# Patient Record
Sex: MALE | Race: OTHER | HISPANIC OR LATINO | Employment: UNEMPLOYED | URBAN - METROPOLITAN AREA
[De-identification: names, ages, dates, MRNs, and addresses within clinical notes are randomized per-mention and may not be internally consistent; named-entity substitution may affect disease eponyms.]

---

## 2018-06-13 ENCOUNTER — HOSPITAL ENCOUNTER (EMERGENCY)
Facility: HOSPITAL | Age: 18
Discharge: HOME | End: 2018-06-13
Attending: EMERGENCY MEDICINE

## 2018-06-13 VITALS
RESPIRATION RATE: 18 BRPM | SYSTOLIC BLOOD PRESSURE: 112 MMHG | OXYGEN SATURATION: 99 % | HEART RATE: 65 BPM | DIASTOLIC BLOOD PRESSURE: 66 MMHG | WEIGHT: 144 LBS | TEMPERATURE: 98.1 F

## 2018-06-13 DIAGNOSIS — R25.2 MUSCLE CRAMPS: Primary | ICD-10-CM

## 2018-06-13 LAB
ANION GAP BLD CALC-SCNC: 17 MMOL/L (ref 4–13)
BUN BLD-MCNC: 13 MG/DL (ref 5–25)
CA-I BLD-SCNC: 1.23 MMOL/L (ref 1.12–1.32)
CHLORIDE BLD-SCNC: 103 MMOL/L (ref 100–108)
CREAT BLD-MCNC: 0.9 MG/DL (ref 0.6–1.3)
GFR SERPL CREATININE-BSD FRML MDRD: 123 ML/MIN/1.73SQ M
GLUCOSE SERPL-MCNC: 70 MG/DL (ref 65–140)
HCT VFR BLD CALC: 40 % (ref 36.5–49.3)
HGB BLDA-MCNC: 13.6 G/DL (ref 12–17)
PCO2 BLD: 27 MMOL/L (ref 21–32)
POTASSIUM BLD-SCNC: 3.9 MMOL/L (ref 3.5–5.3)
SODIUM BLD-SCNC: 143 MMOL/L (ref 136–145)
SPECIMEN SOURCE: ABNORMAL

## 2018-06-13 PROCEDURE — 80047 BASIC METABLC PNL IONIZED CA: CPT

## 2018-06-13 PROCEDURE — 99283 EMERGENCY DEPT VISIT LOW MDM: CPT

## 2018-06-13 PROCEDURE — 85014 HEMATOCRIT: CPT

## 2018-06-13 RX ORDER — NAPROXEN 375 MG/1
375 TABLET ORAL 2 TIMES DAILY WITH MEALS
Qty: 20 TABLET | Refills: 0 | Status: SHIPPED | OUTPATIENT
Start: 2018-06-13 | End: 2019-05-10 | Stop reason: ALTCHOICE

## 2018-06-13 NOTE — DISCHARGE INSTRUCTIONS
Calambres en las piernas   LO QUE NECESITA SABER:   Un calambre en la pierna es un apretón doloroso y repentino en la pantorrilla (pierna inferior) o en los músculos del muslo (pierna superior)  El músculo puede sacudirse debajo de la piel o sentirse tatum  Roque pierna podría sentirse dolorida mucho después de que los músculos se relajan  INSTRUCCIONES SOBRE [facility]:   Para estirar roque pierna:  [place] músculos antes de estirarse  Tali rafiq caminata corta o corra lentamente en lugar  Si usted tiene TEENA Sepulveda que duerme, es posible que también desee estirarse antes de [place]  Los siguientes [address] pantorrillas y los muslos para ayudar a detener o prevenir calambres en las piernas:  · Para estirar roque pantorrilla y roque talón:      ¨ Póngase de pie y apoye las jim de chuck debi sobre rafiq pared  ¨ Inclínese hacia la pared, con rafiq pierna atrás de la otra  Doble la pierna de adelante y deje la pierna de atrás derecha  ¨ Presione el talón de roque pierna de atrás [employer]  ¨ Tali esto por 15 a 30 segundos, entonces cambie de lado  · Para estirar la parte de atrás de roque Binnie Flood y roque muslo:      ¨ Siéntese en el piso con las piernas enfrente suyo  No extienda las puntas de los pies  [address] roque mano a chuck lados en el piso  Deslice chuck debi más allá de chuck caderas hacia roque tobillos  ¨ Muévase hacia chuck tobillos hasta que sienta un estiramiento en la parte posterior de chuck piernas  No intente tocar chuck rodillas con roque kaz ni redondee roque espalda  ¨ Sostenga está posición por 30 segundos  Atiya abundantes líquidos missael el ejercicio:  El agua y [de-identified] líquidos [facility] a prevenir calambres al [employer] líquidos que se pierden al sudar  Atiya más líquidos cuando esté activo cuando hace calor  Para detener un calambre en la pierna si le pasa otra vez:   · Estire roque músculo y bonita un masaje para detener el calambre       · Aplique calor o hielo al calambre  Rafiq almohadilla o compresa caliente podría ayudar a [employer]  Rafiq bolsa de hielo o hielo brody en rafiq bolsa, envuelto en rafiq toalla puede [employer] músculos sensibles o doloridos  [place] roque medicamento según yojana indicaciones:  Llame a roque médico si usted piensa que el medicamento no está ayudando o si tiene efectos secundarios  Infórmele si está tomando vitaminas, hierbas u otros medicamentos  Lleve rafiq lista de los medicamentos que kathy  Incluya los siguientes datos de los medicamentos: cantidad, frecuencia y motivo de administración  Traiga con usted la lista o los envases de la píldoras a yojana citas de seguimiento  Acuda a yojana consultas de control con roque médico según le indicaron  Anote cualquier pregunta que tenga así se acuerda de hacerla missael yojana citas de seguimiento  Pregúntele a roque [place] vitaminas y minerales son adecuados para usted  · Yojana calambres empeoran o suceden más seguido  · Roque pierna se siente entumecida  · Yojana calambres en las piernas no desaparecen al estirarse ni con masajes  · Se siente mareado, aturdido o confundido cuando hace ejercicios y hace calor  Podría tener dolor de kaz o visión borrosa  © 2017 [address] Information is for End User's use only and may not be sold, redistributed or otherwise used for commercial purposes  All illustrations and images included in CareNotes® are the copyrighted property of A D A M , Inc  or [employer]  Esta información es sólo para uso en educación  Roque intención no es darle un consejo médico sobre enfermedades o tratamientos  Colsulte con roque [place] farmacéutico antes de seguir cualquier régimen médico para saber si es seguro y efectivo para usted

## 2018-06-13 NOTE — ED PROVIDER NOTES
History  Chief Complaint   Patient presents with    Leg Pain     Patient reports on and off leg cramps since friday  Patient sent to ED by job  Patient states that cramps occur while working, job requires patient to walk a lot and lift heavy objects  Patient does not currently take any medications for the pain  Denies injury  Tagora [id number] used for triage  History provided by:  Patient   used: No    Leg Pain   Location:  Leg  Injury: no    Leg location:  L lower leg and R lower leg  Pain details:     Quality:  Cramping    Radiates to:  Does not radiate    Severity:  Moderate    Onset quality:  Sudden    Timing:  Intermittent    Progression:  Resolved  Chronicity:  New  Dislocation: no    Foreign body present:  No foreign bodies  Prior injury to area:  No  Relieved by:  Rest  Worsened by:  Exercise  Ineffective treatments:  None tried  Associated symptoms: no back pain, no fatigue, no fever and no neck pain        None       History reviewed  No pertinent past medical history  Past Surgical History:   Procedure Laterality Date    APPENDECTOMY         History reviewed  No pertinent family history  I have reviewed and agree with the history as documented  Social History   Substance Use Topics    Smoking status: Never Smoker    Smokeless tobacco: Never Used    Alcohol use No        Review of Systems   Constitutional: Negative for activity change, appetite change, diaphoresis, fatigue and fever  HENT: Negative for sore throat and trouble swallowing  Eyes: Negative for pain and visual disturbance  Respiratory: Negative for cough, chest tightness and shortness of breath  Cardiovascular: Negative for chest pain  Gastrointestinal: Negative for abdominal pain, diarrhea, nausea and vomiting  Endocrine: Negative for polyphagia and polyuria  Genitourinary: Negative for dysuria, flank pain, frequency, hematuria and urgency  Musculoskeletal: Positive for myalgias  Negative for back pain, gait problem, neck pain and neck stiffness  Skin: Negative for color change and rash  Neurological: Negative for dizziness, speech difficulty, numbness and headaches  Psychiatric/Behavioral: Negative for confusion and decreased concentration  Physical Exam  Physical Exam   Constitutional: He is oriented to person, place, and time  He appears well-developed and well-nourished  HENT:   Head: Normocephalic  Eyes: Conjunctivae and EOM are normal  Pupils are equal, round, and reactive to light  No scleral icterus  Neck: Normal range of motion  Neck supple  Cardiovascular: Normal rate and regular rhythm  Pulmonary/Chest: Effort normal and breath sounds normal  No respiratory distress  Abdominal: Soft  Bowel sounds are normal  He exhibits no distension  There is no tenderness  There is no rebound and no guarding  Musculoskeletal: Normal range of motion  He exhibits no tenderness or deformity  Calves equal and nontender bilaterally  No unilateral swelling, spasm, or palpable cords  Normal neurovascular exam distally  Lymphadenopathy:     He has no cervical adenopathy  Neurological: He is alert and oriented to person, place, and time  Coordination normal    Skin: Skin is warm and dry  He is not diaphoretic  Psychiatric: He has a normal mood and affect  Vitals reviewed        Vital Signs  ED Triage Vitals   Temperature Pulse Respirations Blood Pressure SpO2   06/13/18 1346 06/13/18 1350 06/13/18 1350 06/13/18 1350 06/13/18 1350   98 1 °F (36 7 °C) 65 18 112/66 99 %      Temp Source Heart Rate Source Patient Position - Orthostatic VS BP Location FiO2 (%)   06/13/18 1346 06/13/18 1350 06/13/18 1350 06/13/18 1350 --   Oral Monitor Sitting Right arm       Pain Score       06/13/18 1350       8           Vitals:    06/13/18 1350   BP: 112/66   Pulse: 65   Patient Position - Orthostatic VS: Sitting       Visual Acuity      ED Medications  Medications - No data to display    Diagnostic Studies  Results Reviewed     Procedure Component Value Units Date/Time    POCT Chem 8+ [[id number]]  (Abnormal) Collected:  06/13/18 1521    Lab Status:  Final result Updated:  06/13/18 1526     SODIUM, I-STAT 143 mmol/l      Potassium, i-STAT 3 9 mmol/L      Chloride, istat 103 mmol/L      CO2, i-STAT 27 mmol/L      Anion Gap, Istat 17 (H) mmol/L      Calcium, Ionized i-STAT 1 23 mmol/L      BUN, I-STAT 13 mg/dl      Creatinine, i-STAT 0 9 mg/dl      eGFR 123 ml/min/1 73sq m      Glucose, i-STAT 70 mg/dl      Hct, i-STAT 40 %      Hgb, i-STAT 13 6 g/dl      Specimen Type VENOUS                 No orders to display              Procedures  Procedures       Phone Contacts  ED Phone Contact    ED Course                               MDM  Number of Diagnoses or Management Options  Muscle cramps: new and requires workup  Diagnosis management comments: 20-year-old male presents for evaluation of bilateral leg cramps  The patient works as a  at a [employer], moving, [address], and stacking the clothing  On Friday he experience bilateral leg cramps  while working  It appears he may have had a repeat episode yesterday  He was sent for evaluation in the emergency department after these  He denies any unilateral leg swelling  He has not had any chest pain or shortness of breath  He has no other known medical problems  Denies fevers or chills  He does note that he often does not drink very much water either before or during work  20-year-old male presented for the above  He had normal vital signs and a normal physical exam   Chem eight i-STAT was checked to rule out electrolyte abnormalities such as hypo kalemia or hyponatremia  Fortunately this was within normal limits  Patient will be discharged to follow up with PCP as necessary  Discussed return precautions         Amount and/or Complexity of Data Reviewed  Clinical lab tests: reviewed and ordered  Review and summarize past medical records: yes      CritCare Time    Disposition  Final diagnoses:   Muscle cramps     Time reflects when diagnosis was documented in both MDM as applicable and the Disposition within this note     Time User Action Codes Description Comment    6/13/2018  3:10 PM Sarai Casper Add [R25 2] Muscle cramps       ED Disposition     ED Disposition Condition Comment    Discharge  Vikki Coles 35 discharge to home/self care  Condition at discharge: Good        Follow-up Information     Follow up With Specialties Details Why [facility]  Please call to arrange for follow-up if your symptoms persist   If you have new or worsening symptoms please call your doctor or return to the emergency department  [address] [place] [address] [id number] [phone]          Discharge Medication List as of 6/13/2018  3:15 PM      START taking these medications    Details   naproxen (NAPROSYN) 375 mg tablet Take 1 tablet (375 mg total) by mouth 2 (two) times a day with meals for 10 days, Starting Wed 6/13/2018, Until Sat 6/23/2018, Print           No discharge procedures on file      ED Provider  Electronically Signed by           Flores Beverly MD  06/13/18 0885

## 2019-05-10 ENCOUNTER — HOSPITAL ENCOUNTER (EMERGENCY)
Facility: HOSPITAL | Age: 19
Discharge: HOME | End: 2019-05-10
Attending: EMERGENCY MEDICINE

## 2019-05-10 VITALS
HEART RATE: 63 BPM | TEMPERATURE: 98.6 F | OXYGEN SATURATION: 100 % | DIASTOLIC BLOOD PRESSURE: 55 MMHG | RESPIRATION RATE: 17 BRPM | SYSTOLIC BLOOD PRESSURE: 113 MMHG

## 2019-05-10 DIAGNOSIS — M54.9 BACK PAIN: Primary | ICD-10-CM

## 2019-05-10 PROCEDURE — 99283 EMERGENCY DEPT VISIT LOW MDM: CPT

## 2019-05-10 PROCEDURE — 99284 EMERGENCY DEPT VISIT MOD MDM: CPT | Performed by: PHYSICIAN ASSISTANT

## 2019-05-10 PROCEDURE — 96372 THER/PROPH/DIAG INJ SC/IM: CPT

## 2019-05-10 RX ORDER — LIDOCAINE 50 MG/G
1 PATCH TOPICAL ONCE
Status: DISCONTINUED | OUTPATIENT
Start: 2019-05-10 | End: 2019-05-10 | Stop reason: HOSPADM

## 2019-05-10 RX ORDER — LIDOCAINE 50 MG/G
1 PATCH TOPICAL DAILY
Qty: 30 PATCH | Refills: 0 | Status: SHIPPED | OUTPATIENT
Start: 2019-05-10 | End: 2019-06-21

## 2019-05-10 RX ORDER — KETOROLAC TROMETHAMINE 30 MG/ML
15 INJECTION, SOLUTION INTRAMUSCULAR; INTRAVENOUS ONCE
Status: DISCONTINUED | OUTPATIENT
Start: 2019-05-10 | End: 2019-05-10

## 2019-05-10 RX ORDER — KETOROLAC TROMETHAMINE 30 MG/ML
15 INJECTION, SOLUTION INTRAMUSCULAR; INTRAVENOUS ONCE
Status: COMPLETED | OUTPATIENT
Start: 2019-05-10 | End: 2019-05-10

## 2019-05-10 RX ORDER — IBUPROFEN 600 MG/1
600 TABLET ORAL EVERY 6 HOURS PRN
Qty: 30 TABLET | Refills: 0 | Status: SHIPPED | OUTPATIENT
Start: 2019-05-10 | End: 2019-06-21

## 2019-05-10 RX ADMIN — LIDOCAINE 1 PATCH: 50 PATCH TOPICAL at 13:41

## 2019-05-10 RX ADMIN — KETOROLAC TROMETHAMINE 15 MG: 30 INJECTION, SOLUTION INTRAMUSCULAR; INTRAVENOUS at 13:41

## 2019-06-21 ENCOUNTER — HOSPITAL ENCOUNTER (EMERGENCY)
Facility: HOSPITAL | Age: 19
Discharge: HOME | End: 2019-06-21
Attending: EMERGENCY MEDICINE

## 2019-06-21 VITALS
OXYGEN SATURATION: 98 % | WEIGHT: 125.88 LBS | TEMPERATURE: 98.5 F | SYSTOLIC BLOOD PRESSURE: 107 MMHG | HEART RATE: 60 BPM | DIASTOLIC BLOOD PRESSURE: 59 MMHG | RESPIRATION RATE: 18 BRPM

## 2019-06-21 DIAGNOSIS — K52.9 GASTROENTERITIS: Primary | ICD-10-CM

## 2019-06-21 LAB
BILIRUB UR QL STRIP: NEGATIVE
CLARITY UR: CLEAR
COLOR UR: YELLOW
GLUCOSE UR STRIP-MCNC: NEGATIVE MG/DL
HGB UR QL STRIP.AUTO: NEGATIVE
KETONES UR STRIP-MCNC: NEGATIVE MG/DL
LEUKOCYTE ESTERASE UR QL STRIP: NEGATIVE
NITRITE UR QL STRIP: NEGATIVE
PH UR STRIP.AUTO: 7 [PH] (ref 4.5–8)
PROT UR STRIP-MCNC: NEGATIVE MG/DL
SP GR UR STRIP.AUTO: 1.02 (ref 1–1.03)
UROBILINOGEN UR QL STRIP.AUTO: 0.2 E.U./DL

## 2019-06-21 PROCEDURE — 81003 URINALYSIS AUTO W/O SCOPE: CPT

## 2019-06-21 PROCEDURE — 99283 EMERGENCY DEPT VISIT LOW MDM: CPT

## 2019-06-21 PROCEDURE — 99283 EMERGENCY DEPT VISIT LOW MDM: CPT | Performed by: EMERGENCY MEDICINE

## 2019-06-21 RX ORDER — ONDANSETRON 4 MG/1
4 TABLET, ORALLY DISINTEGRATING ORAL EVERY 8 HOURS PRN
Qty: 10 TABLET | Refills: 0 | Status: SHIPPED | OUTPATIENT
Start: 2019-06-21 | End: 2019-08-01

## 2019-06-21 RX ORDER — MAGNESIUM HYDROXIDE/ALUMINUM HYDROXICE/SIMETHICONE 120; 1200; 1200 MG/30ML; MG/30ML; MG/30ML
30 SUSPENSION ORAL ONCE
Status: COMPLETED | OUTPATIENT
Start: 2019-06-21 | End: 2019-06-21

## 2019-06-21 RX ORDER — ONDANSETRON 4 MG/1
4 TABLET, ORALLY DISINTEGRATING ORAL ONCE
Status: COMPLETED | OUTPATIENT
Start: 2019-06-21 | End: 2019-06-21

## 2019-06-21 RX ORDER — FAMOTIDINE 20 MG/1
20 TABLET, FILM COATED ORAL 2 TIMES DAILY
Qty: 20 TABLET | Refills: 0 | Status: SHIPPED | OUTPATIENT
Start: 2019-06-21 | End: 2019-08-01

## 2019-06-21 RX ORDER — LIDOCAINE HYDROCHLORIDE 20 MG/ML
15 SOLUTION OROPHARYNGEAL ONCE
Status: COMPLETED | OUTPATIENT
Start: 2019-06-21 | End: 2019-06-21

## 2019-06-21 RX ADMIN — ALUMINUM HYDROXIDE, MAGNESIUM HYDROXIDE, AND SIMETHICONE 30 ML: 200; 200; 20 SUSPENSION ORAL at 17:59

## 2019-06-21 RX ADMIN — LIDOCAINE HYDROCHLORIDE 15 ML: 20 SOLUTION ORAL; TOPICAL at 17:59

## 2019-06-21 RX ADMIN — ONDANSETRON 4 MG: 4 TABLET, ORALLY DISINTEGRATING ORAL at 17:46

## 2019-06-21 NOTE — ED PROVIDER NOTES
History  Chief Complaint   Patient presents with    Vomiting     vomiting and diarrhea since this morning  Pt reports his stoming is "bubbling"  Pt denies fevers        History provided by:  Patient   used: No    Diarrhea   Quality:  Semi-solid  Severity:  Moderate  Onset quality:  Gradual  Duration:  10 hours  Timing:  Intermittent  Progression:  Improving  Relieved by:  Nothing  Worsened by:  Nothing  Ineffective treatments:  None tried  Associated symptoms: abdominal pain and vomiting    Associated symptoms: no chills, no fever and no headaches    Abdominal pain:     Location:  Epigastric    Quality: aching      Severity:  Mild    Onset quality:  Gradual    Duration:  10 hours    Timing:  Intermittent    Progression:  Waxing and waning    Chronicity:  New  Vomiting:     Quality:  Stomach contents    Number of occurrences: Once    Severity:  Mild    Duration:  10 hours    Timing:  Intermittent    Progression:  Unchanged  Risk factors: suspect food intake        None       History reviewed  No pertinent past medical history  Past Surgical History:   Procedure Laterality Date    APPENDECTOMY         History reviewed  No pertinent family history  I have reviewed and agree with the history as documented  Social History     Tobacco Use    Smoking status: Never Smoker    Smokeless tobacco: Never Used   Substance Use Topics    Alcohol use: No    Drug use: No        Review of Systems   Constitutional: Negative for chills and fever  HENT: Negative for facial swelling, sore throat and trouble swallowing  Eyes: Negative for pain and visual disturbance  Respiratory: Negative for cough and shortness of breath  Cardiovascular: Negative for chest pain and leg swelling  Gastrointestinal: Positive for abdominal pain, diarrhea and vomiting  Negative for blood in stool and nausea  Genitourinary: Negative for dysuria and flank pain     Musculoskeletal: Negative for back pain, neck pain and neck stiffness  Skin: Negative for pallor and rash  Allergic/Immunologic: Negative for environmental allergies and immunocompromised state  Neurological: Negative for dizziness and headaches  Hematological: Negative for adenopathy  Does not bruise/bleed easily  Psychiatric/Behavioral: Negative for agitation and behavioral problems  All other systems reviewed and are negative  Physical Exam  Physical Exam   Constitutional: He is oriented to person, place, and time  He appears well-developed and well-nourished  No distress  HENT:   Head: Normocephalic and atraumatic  Eyes: EOM are normal    Neck: Normal range of motion  Neck supple  Cardiovascular: Normal rate, regular rhythm, normal heart sounds and intact distal pulses  Pulmonary/Chest: Effort normal and breath sounds normal    Abdominal: Soft  Bowel sounds are normal  There is tenderness (epigastric)  There is no rebound and no guarding  Musculoskeletal: Normal range of motion  Neurological: He is alert and oriented to person, place, and time  Skin: Skin is warm and dry  Psychiatric: He has a normal mood and affect  Nursing note and vitals reviewed        Vital Signs  ED Triage Vitals [06/21/19 1631]   Temperature Pulse Respirations Blood Pressure SpO2   98 5 °F (36 9 °C) 60 18 107/59 98 %      Temp Source Heart Rate Source Patient Position - Orthostatic VS BP Location FiO2 (%)   Oral Monitor Sitting Right arm --      Pain Score       --           Vitals:    06/21/19 1631   BP: 107/59   Pulse: 60   Patient Position - Orthostatic VS: Sitting         Visual Acuity      ED Medications  Medications   ondansetron (ZOFRAN-ODT) dispersible tablet 4 mg (4 mg Oral Given 6/21/19 1746)   aluminum-magnesium hydroxide-simethicone (MYLANTA) 200-200-20 mg/5 mL oral suspension 30 mL (30 mL Oral Given 6/21/19 1759)   Lidocaine Viscous HCl (XYLOCAINE) 2 % mucosal solution 15 mL (15 mL Swish & Swallow Given 6/21/19 1759)       Diagnostic Studies  Results Reviewed     Procedure Component Value Units Date/Time    POCT urinalysis dipstick [[id number]]  (Normal) Resulted:  06/21/19 1736    Lab Status:  Final result Updated:  06/21/19 1736    ED Urine Macroscopic [[id number]] Collected:  06/21/19 1741    Lab Status:  Final result Specimen:  Urine Updated:  06/21/19 1731     Color, UA Yellow     Clarity, UA Clear     pH, UA 7 0     Leukocytes, UA Negative     Nitrite, UA Negative     Protein, UA Negative mg/dl      Glucose, UA Negative mg/dl      Ketones, UA Negative mg/dl      Urobilinogen, UA 0 2 E U /dl      Bilirubin, UA Negative     Blood, UA Negative     Specific Gravity, UA 1 020    Narrative:       CLINITEK RESULT                 No orders to display              Procedures  Procedures       ED Course                               MDM  Number of Diagnoses or Management Options  Gastroenteritis:   Diagnosis management comments: Patient with vomiting once, diarrhea few times, upper abdominal pain, started today morning, denies fever, blood in stool or vomitus  On exam, no acute distress, VSS, Abd soft, mild tenderness epigastrium  Impression: Gastritis, Viral GI illness  We will give GI Cocktail, requests work note  Disposition  Final diagnoses:   Gastroenteritis     Time reflects when diagnosis was documented in both MDM as applicable and the Disposition within this note     Time User Action Codes Description Comment    6/21/2019  5:46 PM Hailee Butterfield Add [K52 9] Gastroenteritis       ED Disposition     ED Disposition Condition Date/Time Comment    Discharge Stable Fri Jun 21, 2019  5:46 PM Vikki Coles 35 discharge to home/self care              Follow-up Information    None         Discharge Medication List as of 6/21/2019  5:47 PM      START taking these medications    Details   famotidine (PEPCID) 20 mg tablet Take 1 tablet (20 mg total) by mouth 2 (two) times a day for 10 days, Starting Fri 6/21/2019, Until Mon 7/1/2019, Print ondansetron (ZOFRAN-ODT) 4 mg disintegrating tablet Take 1 tablet (4 mg total) by mouth every 8 (eight) hours as needed for nausea or vomiting for up to 5 days, Starting Fri 6/21/2019, Until Wed 6/26/2019, Print           No discharge procedures on file      ED Provider  Electronically Signed by           Emma Murphy MD  06/21/19 6689

## 2019-08-01 ENCOUNTER — APPOINTMENT (EMERGENCY)
Dept: RADIOLOGY | Facility: HOSPITAL | Age: 19
End: 2019-08-01

## 2019-08-01 ENCOUNTER — HOSPITAL ENCOUNTER (EMERGENCY)
Facility: HOSPITAL | Age: 19
Discharge: HOME | End: 2019-08-01
Attending: EMERGENCY MEDICINE | Admitting: EMERGENCY MEDICINE

## 2019-08-01 VITALS
DIASTOLIC BLOOD PRESSURE: 56 MMHG | TEMPERATURE: 97.9 F | SYSTOLIC BLOOD PRESSURE: 99 MMHG | OXYGEN SATURATION: 100 % | WEIGHT: 127.21 LBS | HEART RATE: 54 BPM | RESPIRATION RATE: 18 BRPM

## 2019-08-01 DIAGNOSIS — R05.9 COUGH: ICD-10-CM

## 2019-08-01 DIAGNOSIS — R07.89 ATYPICAL CHEST PAIN: Primary | ICD-10-CM

## 2019-08-01 LAB
ATRIAL RATE: 62 BPM
P AXIS: 63 DEGREES
PR INTERVAL: 148 MS
QRS AXIS: 83 DEGREES
QRSD INTERVAL: 92 MS
QT INTERVAL: 384 MS
QTC INTERVAL: 389 MS
T WAVE AXIS: 47 DEGREES
VENTRICULAR RATE: 62 BPM

## 2019-08-01 PROCEDURE — 99285 EMERGENCY DEPT VISIT HI MDM: CPT | Performed by: EMERGENCY MEDICINE

## 2019-08-01 PROCEDURE — 93005 ELECTROCARDIOGRAM TRACING: CPT

## 2019-08-01 PROCEDURE — 99285 EMERGENCY DEPT VISIT HI MDM: CPT

## 2019-08-01 PROCEDURE — 93010 ELECTROCARDIOGRAM REPORT: CPT | Performed by: INTERNAL MEDICINE

## 2019-08-01 PROCEDURE — 71046 X-RAY EXAM CHEST 2 VIEWS: CPT

## 2019-08-01 RX ORDER — AZITHROMYCIN 250 MG/1
TABLET, FILM COATED ORAL
Qty: 6 TABLET | Refills: 0 | Status: SHIPPED | OUTPATIENT
Start: 2019-08-01 | End: 2019-08-05

## 2019-08-01 NOTE — ED PROVIDER NOTES
History  Chief Complaint   Patient presents with    Chest Pain     mid sternal chest pain x 2 days  pt states it feels like squeexing  Pt denies n/v/sob  Pt denies PMH      This is an otherwise healthy 22-year-old male presents with chest pain and cough  The patient is Chinese-speaking only so the  line was used  Over the past 3 days, the patient has been experiencing a cough productive of a yellow sputum  The patient states that whenever he coughs, he gets a tightness in the center of his chest   He has never experienced these symptoms before  Denies any viral URI type symptoms  Denies any sick contacts  Chest pain is only reproduced on coughing  The patient denies tobacco use or history of asthma  Denies history of hypertension, hyperlipidemia, diabetes, obesity, tobacco use (within last 3 months), family history of CAD (before age 72), personal history of MI, PAD, CVA  Denies history of DVT/PE (also, no objective results indicating DVT/PE), unilateral calf pain/swelling, hemoptysis, recent trauma/surgery (</= 4 weeks ago requiring general anesthesia), recent travel, cancer/cancer treatment (in last 6 months), exogenous estrogen use  Denies fever/chills, nausea/vomiting, lightheadedness/dizziness, numbness/weakness, headache, change in vision, URI symptoms, neck pain, palpitations, shortness of breath, back pain, flank pain, abdominal pain, diarrhea, hematochezia, melena, dysuria, hematuria  None       Past Medical History:   Diagnosis Date    No known health problems        Past Surgical History:   Procedure Laterality Date    APPENDECTOMY         History reviewed  No pertinent family history  I have reviewed and agree with the history as documented      Social History     Tobacco Use    Smoking status: Never Smoker    Smokeless tobacco: Never Used   Substance Use Topics    Alcohol use: No    Drug use: No        Review of Systems   Constitutional: Negative for chills, fatigue and fever  HENT: Negative for rhinorrhea, sore throat and trouble swallowing  Eyes: Negative for photophobia and visual disturbance  Respiratory: Positive for cough and chest tightness  Negative for shortness of breath  Cardiovascular: Negative for chest pain, palpitations and leg swelling  Gastrointestinal: Negative for abdominal pain, blood in stool, diarrhea, nausea and vomiting  Endocrine: Negative for polyuria  Genitourinary: Negative for dysuria, flank pain and hematuria  Musculoskeletal: Negative for back pain and neck pain  Skin: Negative for color change and rash  Allergic/Immunologic: Negative for immunocompromised state  Neurological: Negative for dizziness, weakness, light-headedness, numbness and headaches  All other systems reviewed and are negative  Physical Exam  Physical Exam   Constitutional: Vital signs are normal  He appears well-developed and well-nourished  He is cooperative  No distress  HENT:   Mouth/Throat: Uvula is midline and oropharynx is clear and moist    Eyes: Pupils are equal, round, and reactive to light  Conjunctivae, EOM and lids are normal    Neck: Trachea normal  No thyroid mass and no thyromegaly present  Cardiovascular: Normal rate, regular rhythm, normal heart sounds, intact distal pulses and normal pulses  No murmur heard  Pulmonary/Chest: Effort normal and breath sounds normal    Abdominal: Soft  Normal appearance and bowel sounds are normal  There is no tenderness  There is no rebound, no guarding, no CVA tenderness and negative Marino's sign  Neurological: He is alert  Skin: Skin is warm, dry and intact  Psychiatric: He has a normal mood and affect   His speech is normal and behavior is normal  Thought content normal        Vital Signs  ED Triage Vitals [08/01/19 1219]   Temperature Pulse Respirations Blood Pressure SpO2   97 9 °F (36 6 °C) (!) 54 18 99/56 100 %      Temp Source Heart Rate Source Patient Position - Orthostatic VS BP Location FiO2 (%)   Oral Monitor Sitting Right arm --      Pain Score       --           Vitals:    08/01/19 1219   BP: 99/56   Pulse: (!) 54   Patient Position - Orthostatic VS: Sitting         Visual Acuity      ED Medications  Medications - No data to display    Diagnostic Studies  Results Reviewed     None                 XR chest 2 views    (Results Pending)              Procedures  ECG 12 Lead Documentation Only  Date/Time: 8/1/2019 12:24 PM  Performed by: Jeremiah Rush MD  Authorized by: Jeremiah Rush MD     ECG reviewed by me, the ED Provider: yes    Patient location:  ED  Previous ECG:     Previous ECG:  Unavailable    Comparison to cardiac monitor: Yes    Interpretation:     Interpretation: normal    Rate:     ECG rate:  62    ECG rate assessment: normal    Rhythm:     Rhythm: sinus rhythm    Ectopy:     Ectopy: none    QRS:     QRS axis:  Normal    QRS intervals:  Normal  Conduction:     Conduction: normal    T waves:     T waves: normal             ED Course               PERC Rule for PE      Most Recent Value   PERC Rule for PE   Age >=50  0 Filed at: 08/01/2019 1237   HR >=100  0 Filed at: 08/01/2019 1237   O2 Sat on room air < 95%  0 Filed at: 08/01/2019 1237   History of PE or DVT  0 Filed at: 08/01/2019 1237   Recent trauma or surgery  0 Filed at: 08/01/2019 1237   Hemoptysis  0 Filed at: 08/01/2019 1237   Exogenous estrogen  0 Filed at: 08/01/2019 1237   Unilateral leg swelling  0 Filed at: 08/01/2019 1237   PERC Rule for PE Results  0 Filed at: 08/01/2019 1237                Wells' Criteria for PE      Most Recent Value   Wells' Criteria for PE   Clinical signs and symptoms of DVT  0 Filed at: 08/01/2019 1237   PE is primary diagnosis or equally likely  0 Filed at: 08/01/2019 1237   HR >100  0 Filed at: 08/01/2019 1237   Immobilization at least 3 days or Surgery in the previous 4 weeks  0 Filed at: 08/01/2019 1237   Previous, objectively diagnosed PE or DVT  0 Filed at: 08/01/2019 1237 Hemoptysis  0 Filed at: 08/01/2019 1237   Malignancy with treatment within 6 months or palliative  0 Filed at: 08/01/2019 1237   Wells' Criteria Total  0 Filed at: 08/01/2019 1237            [facility]  Number of Diagnoses or Management Options  Diagnosis management comments: This is a well-appearing 69-year-old male with cough and chest tightness  Will check an EKG and chest x-ray  The patient will likely be discharged  Disposition  Final diagnoses:   Atypical chest pain   Cough     Time reflects when diagnosis was documented in both MDM as applicable and the Disposition within this note     Time User Action Codes Description Comment    8/1/2019  1:32 PM Shay Boone Add [R07 89] Atypical chest pain     8/1/2019  1:32 PM Mireya Care P Add [R05] Cough       ED Disposition     ED Disposition Condition Date/Time Comment    Discharge Stable Thu Aug 1, 2019  1:32 PM Vikki Coles 35 discharge to home/self care  Follow-up Information     Follow up With Specialties Details Why Contact Info Additional [address] [facility] Emergency Department Emergency Medicine Go to  If symptoms worsen [facility] [address] [phone] [place], [address] [place] [address], [id number]          Patient's Medications   Discharge Prescriptions    AZITHROMYCIN (ZITHROMAX) 250 MG TABLET    Take 2 tablets today then 1 tablet daily x 4 days       Start Date: 8/1/2019  End Date: 8/5/2019       Order Dose: --       Quantity: 6 tablet    Refills: 0     No discharge procedures on file      ED Provider  Electronically Signed by           Silva Joe MD  08/01/19 0094

## 2019-12-06 ENCOUNTER — APPOINTMENT (EMERGENCY)
Dept: ULTRASOUND IMAGING | Facility: HOSPITAL | Age: 19
End: 2019-12-06

## 2019-12-06 ENCOUNTER — HOSPITAL ENCOUNTER (EMERGENCY)
Facility: HOSPITAL | Age: 19
Discharge: HOME | End: 2019-12-06
Attending: EMERGENCY MEDICINE

## 2019-12-06 VITALS
RESPIRATION RATE: 18 BRPM | SYSTOLIC BLOOD PRESSURE: 115 MMHG | HEART RATE: 61 BPM | OXYGEN SATURATION: 99 % | DIASTOLIC BLOOD PRESSURE: 72 MMHG | TEMPERATURE: 98.9 F | WEIGHT: 126.1 LBS

## 2019-12-06 DIAGNOSIS — N45.1 EPIDIDYMITIS: Primary | ICD-10-CM

## 2019-12-06 LAB
BILIRUB UR QL STRIP: NEGATIVE
CLARITY UR: NORMAL
COLOR UR: YELLOW
COLOR, POC: YELLOW
GLUCOSE UR STRIP-MCNC: NEGATIVE MG/DL
HGB UR QL STRIP.AUTO: NEGATIVE
KETONES UR STRIP-MCNC: NEGATIVE MG/DL
LEUKOCYTE ESTERASE UR QL STRIP: NEGATIVE
NITRITE UR QL STRIP: NEGATIVE
PH UR STRIP.AUTO: 7.5 [PH] (ref 4.5–8)
PROT UR STRIP-MCNC: NEGATIVE MG/DL
SP GR UR STRIP.AUTO: 1.02 (ref 1–1.03)
UROBILINOGEN UR QL STRIP.AUTO: 0.2 E.U./DL

## 2019-12-06 PROCEDURE — 99285 EMERGENCY DEPT VISIT HI MDM: CPT | Performed by: EMERGENCY MEDICINE

## 2019-12-06 PROCEDURE — 76870 US EXAM SCROTUM: CPT

## 2019-12-06 PROCEDURE — 96372 THER/PROPH/DIAG INJ SC/IM: CPT

## 2019-12-06 PROCEDURE — 99284 EMERGENCY DEPT VISIT MOD MDM: CPT

## 2019-12-06 PROCEDURE — 81003 URINALYSIS AUTO W/O SCOPE: CPT

## 2019-12-06 RX ORDER — LEVOFLOXACIN 500 MG/1
500 TABLET, FILM COATED ORAL DAILY
Qty: 9 TABLET | Refills: 0 | Status: SHIPPED | OUTPATIENT
Start: 2019-12-06 | End: 2019-12-15

## 2019-12-06 RX ORDER — LEVOFLOXACIN 500 MG/1
500 TABLET, FILM COATED ORAL ONCE
Status: COMPLETED | OUTPATIENT
Start: 2019-12-06 | End: 2019-12-06

## 2019-12-06 RX ORDER — IBUPROFEN 600 MG/1
600 TABLET ORAL ONCE
Status: COMPLETED | OUTPATIENT
Start: 2019-12-06 | End: 2019-12-06

## 2019-12-06 RX ADMIN — LEVOFLOXACIN 500 MG: 500 TABLET, FILM COATED ORAL at 13:50

## 2019-12-06 RX ADMIN — LIDOCAINE HYDROCHLORIDE 250 MG: 10 INJECTION, SOLUTION EPIDURAL; INFILTRATION; INTRACAUDAL; PERINEURAL at 13:53

## 2019-12-06 RX ADMIN — IBUPROFEN 600 MG: 600 TABLET ORAL at 13:50

## 2019-12-06 NOTE — ED PROVIDER NOTES
History  Chief Complaint   Patient presents with    Testicle Pain     bilateral testicle pain, difficulty urinating, left testicle swollen  symptoms began yesterday     21 YO male presents with pain in the scrotum  Pt states this began yesterday, gradually  This has been aching, constant, pain is non-radiating, worse with walking  Pt denies similar pain in the past  Pt denies pain with urination but states it has been difficult to initiate stream  He has noticed swelling in the testicles  Pt is sequally active, he denies concern for STI  Pt denies CP/SOB/F/C/N/V/D/C, no dysuria, burning on urination or blood in urine  History provided by:  Patient   used: No    Testicle Pain   Location:  Testicles  Quality:  Aching  Severity:  Moderate  Onset quality:  Gradual  Duration:  1 day  Timing:  Constant  Progression:  Worsening  Chronicity:  New  Relieved by:  Nothing  Worsened by: Walking  Ineffective treatments:  None tried  Associated symptoms: no abdominal pain, no chest pain, no fever, no nausea, no rash, no shortness of breath and no vomiting        None       Past Medical History:   Diagnosis Date    No known health problems        Past Surgical History:   Procedure Laterality Date    APPENDECTOMY         History reviewed  No pertinent family history  I have reviewed and agree with the history as documented  Social History     Tobacco Use    Smoking status: Never Smoker    Smokeless tobacco: Never Used   Substance Use Topics    Alcohol use: Yes    Drug use: No        Review of Systems   Constitutional: Negative for fever  HENT: Negative for dental problem  Eyes: Negative for visual disturbance  Respiratory: Negative for shortness of breath  Cardiovascular: Negative for chest pain  Gastrointestinal: Negative for abdominal pain, nausea and vomiting  Genitourinary: Positive for testicular pain  Negative for dysuria and frequency     Musculoskeletal: Negative for neck pain and neck stiffness  Skin: Negative for rash  Neurological: Negative for dizziness, weakness and light-headedness  Psychiatric/Behavioral: Negative for agitation, behavioral problems and confusion  All other systems reviewed and are negative  Physical Exam  Physical Exam   Constitutional: He is oriented to person, place, and time  He appears well-developed and well-nourished  HENT:   Head: Normocephalic and atraumatic  Eyes: EOM are normal    Neck: Normal range of motion  Cardiovascular: Normal rate, regular rhythm and normal heart sounds  Pulmonary/Chest: Effort normal and breath sounds normal    Abdominal: Soft  Genitourinary: Testes normal and penis normal  Cremasteric reflex is present  Right testis shows no mass  Left testis shows no mass  Uncircumcised  Musculoskeletal: Normal range of motion  Neurological: He is alert and oriented to person, place, and time  Skin: Skin is warm and dry  Psychiatric: He has a normal mood and affect  His behavior is normal    Nursing note and vitals reviewed        Vital Signs  ED Triage Vitals   Temperature Pulse Respirations Blood Pressure SpO2   12/06/19 1113 12/06/19 1113 12/06/19 1113 12/06/19 1113 12/06/19 1113   98 9 °F (37 2 °C) 65 14 122/61 99 %      Temp Source Heart Rate Source Patient Position - Orthostatic VS BP Location FiO2 (%)   12/06/19 1113 12/06/19 1113 12/06/19 1326 12/06/19 1113 --   Temporal Monitor Lying Right arm       Pain Score       12/06/19 1113       7           Vitals:    12/06/19 1113 12/06/19 1326   BP: 122/61 115/72   Pulse: 65 61   Patient Position - Orthostatic VS:  Lying         Visual Acuity      ED Medications  Medications   ibuprofen (MOTRIN) tablet 600 mg (600 mg Oral Given 12/6/19 1350)   cefTRIAXone (ROCEPHIN) 250 mg in lidocaine (PF) (XYLOCAINE-MPF) 1 % IM only syringe (250 mg Intramuscular Given 12/6/19 1353)   levofloxacin (LEVAQUIN) tablet 500 mg (500 mg Oral Given 12/6/19 1350)       Diagnostic Studies  Results Reviewed     Procedure Component Value Units Date/Time    POCT urinalysis dipstick [[id number]]  (Normal) Resulted:  12/06/19 1147    Lab Status:  Final result Updated:  12/06/19 1147     Color, UA yellow    Urine Macroscopic, POC [[id number]] Collected:  12/06/19 1142    Lab Status:  Final result Specimen:  Urine Updated:  12/06/19 1143     Color, UA Yellow     Clarity, UA Cloudy     pH, UA 7 5     Leukocytes, UA Negative     Nitrite, UA Negative     Protein, UA Negative mg/dl      Glucose, UA Negative mg/dl      Ketones, UA Negative mg/dl      Urobilinogen, UA 0 2 E U /dl      Bilirubin, UA Negative     Blood, UA Negative     Specific Gravity, UA 1 020    Narrative:       CLINITEK RESULT                 US scrotum and testicles   Final Result by Ricardo Cueva MD (12/06 1329)       Questionable trace increased vascularity left testicle could relate to mild orchitis if clinically suspected  Small bilateral hydroceles  Workstation performed: [id number]                    Procedures  Procedures         ED Course                               MDM  Number of Diagnoses or Management Options  Epididymitis: new and requires workup  Diagnosis management comments: 1  Testicular pain - Pt with pain since yesterday, gradual in onset  Will check urine for infection, U/S testicles         Amount and/or Complexity of Data Reviewed  Clinical lab tests: ordered and reviewed  Tests in the radiology section of CPT®: ordered and reviewed  Independent visualization of images, tracings, or specimens: yes    Patient Progress  Patient progress: stable        Disposition  Final diagnoses:   Epididymitis     Time reflects when diagnosis was documented in both MDM as applicable and the Disposition within this note     Time User Action Codes Description Comment    12/6/2019  2:10 PM Suzanne Andino Add [N45 1] Epididymitis       ED Disposition     ED Disposition Condition Date/Time Comment    Discharge Stable Fri Dec 6, 2019 2:10 PM Vikki Coles 35 discharge to home/self care  Follow-up Information     Follow up With Specialties Details Why Contact Conor Kathleen MD Family Medicine Schedule an appointment as soon as possible for a visit   [address]  [address] [facility]  [place]  [phone]  [phone]  [phone]            Discharge Medication List as of 12/6/2019  2:15 PM      START taking these medications    Details   levofloxacin (LEVAQUIN) 500 mg tablet Take 1 tablet (500 mg total) by mouth daily for 9 days, Starting Fri 12/6/2019, Until Sun 12/15/2019, Print           No discharge procedures on file      ED Provider  Electronically Signed by           Ramon Marsh MD  12/06/19 2857

## 2019-12-06 NOTE — DISCHARGE INSTRUCTIONS
The number for the [facility] has been included in these discharge instructions, you should call to establish continuing medical care  Take the levofloxacin daily for the next 9 days  Start this medication tomorrow and make sure to finish the entire course  [address] Familiar se castillo incluido en estas instrucciones de tom, debe llamar para establecer atención médica continua  Newborn la levofloxacina diariamente missael los próximos 9 días  Comience scottie medicamento mañana y asegúrese de terminar todo el curso

## 2020-08-02 ENCOUNTER — HOSPITAL ENCOUNTER (EMERGENCY)
Facility: HOSPITAL | Age: 20
Discharge: HOME | End: 2020-08-02
Attending: EMERGENCY MEDICINE | Admitting: EMERGENCY MEDICINE

## 2020-08-02 VITALS
SYSTOLIC BLOOD PRESSURE: 110 MMHG | TEMPERATURE: 98.7 F | RESPIRATION RATE: 20 BRPM | HEART RATE: 51 BPM | WEIGHT: 128.53 LBS | OXYGEN SATURATION: 99 % | DIASTOLIC BLOOD PRESSURE: 57 MMHG

## 2020-08-02 DIAGNOSIS — H66.91 RIGHT OTITIS MEDIA, UNSPECIFIED OTITIS MEDIA TYPE: Primary | ICD-10-CM

## 2020-08-02 PROCEDURE — 99283 EMERGENCY DEPT VISIT LOW MDM: CPT | Performed by: EMERGENCY MEDICINE

## 2020-08-02 PROCEDURE — 99283 EMERGENCY DEPT VISIT LOW MDM: CPT

## 2020-08-02 RX ORDER — AMOXICILLIN 500 MG/1
500 CAPSULE ORAL EVERY 12 HOURS SCHEDULED
Qty: 14 CAPSULE | Refills: 0 | Status: SHIPPED | OUTPATIENT
Start: 2020-08-02 | End: 2020-08-09

## 2020-08-03 NOTE — ED PROVIDER NOTES
History  Chief Complaint   Patient presents with   Areli [place]     began 1 week PTA, no other complaints     23year old male presents to the emergency department for evaluation of right ear pain for the last week  He denies any fever, cough, congestion, ear drainage, or recent swimming  History provided by:  Patient   used: No    Earache   Location:  Right  Behind ear:  No abnormality  Quality:  Aching  Severity:  Mild  Onset quality:  Gradual  Duration:  1 week  Timing:  Constant  Progression:  Unchanged  Chronicity:  New  Context: not direct blow, not elevation change, not foreign body in ear, not loud noise, not recent URI and not water in ear    Relieved by:  None tried  Worsened by:  Nothing  Ineffective treatments:  None tried  Associated symptoms: no abdominal pain, no congestion, no cough, no diarrhea, no ear discharge, no fever, no headaches, no hearing loss, no neck pain, no rash and no vomiting        None       Past Medical History:   Diagnosis Date    No known health problems        Past Surgical History:   Procedure Laterality Date    APPENDECTOMY         History reviewed  No pertinent family history  I have reviewed and agree with the history as documented  E-Cigarette/Vaping     E-Cigarette/Vaping Substances     Social History     Tobacco Use    Smoking status: Never Smoker    Smokeless tobacco: Never Used   Substance Use Topics    Alcohol use: Yes    Drug use: No       Review of Systems   Constitutional: Negative for fever  HENT: Positive for ear pain  Negative for congestion, ear discharge and hearing loss  Respiratory: Negative for cough  Gastrointestinal: Negative for abdominal pain, diarrhea and vomiting  Musculoskeletal: Negative for neck pain  Skin: Negative for rash  Neurological: Negative for headaches  All other systems reviewed and are negative  Physical Exam  Physical Exam  Vitals signs and nursing note reviewed     Constitutional: General: He is not in acute distress  Appearance: He is well-developed  He is not ill-appearing or toxic-appearing  HENT:      Head: Normocephalic and atraumatic  Right Ear: Hearing and external ear normal  No mastoid tenderness  Tympanic membrane is injected  Left Ear: Hearing and external ear normal  No mastoid tenderness  Nose: Nose normal    Eyes:      Conjunctiva/sclera: Conjunctivae normal    Neck:      Vascular: No JVD  Trachea: No tracheal deviation  Cardiovascular:      Rate and Rhythm: Normal rate and regular rhythm  Heart sounds: Normal heart sounds, S1 normal and S2 normal    Pulmonary:      Effort: Pulmonary effort is normal  No accessory muscle usage or respiratory distress  Breath sounds: Normal breath sounds  No stridor  No decreased breath sounds, wheezing or rales  Musculoskeletal:      Comments: Moves all four limbs without difficulty, crepitus, swelling, or deformity  Lymphadenopathy:      Cervical: No cervical adenopathy  Skin:     General: Skin is warm and dry  Capillary Refill: Capillary refill takes less than 2 seconds  Findings: No rash  Neurological:      Mental Status: He is alert and oriented to person, place, and time  GCS: GCS eye subscore is 4  GCS verbal subscore is 5  GCS motor subscore is 6     Psychiatric:         Speech: Speech normal          Vital Signs  ED Triage Vitals [08/02/20 1955]   Temperature Pulse Respirations Blood Pressure SpO2   98 7 °F (37 1 °C) (!) 51 20 110/57 99 %      Temp Source Heart Rate Source Patient Position - Orthostatic VS BP Location FiO2 (%)   Temporal Monitor Sitting Right arm --      Pain Score       6           Vitals:    08/02/20 1955   BP: 110/57   Pulse: (!) 51   Patient Position - Orthostatic VS: Sitting         Visual Acuity      ED Medications  Medications - No data to display    Diagnostic Studies  Results Reviewed     None                 No orders to display Procedures  Procedures         ED Course       US AUDIT      Most Recent Value   Initial Alcohol Screen: US AUDIT-C    1  How often do you have a drink containing alcohol?  0 Filed at: 08/02/2020 1956   2  How many drinks containing alcohol do you have on a typical day you are drinking? 0 Filed at: 08/02/2020 1956   3a  Male UNDER 65: How often do you have five or more drinks on one occasion? 0 Filed at: 08/02/2020 1956   Audit-C Score  0 Filed at: 08/02/2020 1956                  ANALI/DAST-10      Most Recent Value   How many times in the past year have you    Used an illegal drug or used a prescription medication for non-medical reasons? Never Filed at: 08/02/2020 1956                                MDM  Number of Diagnoses or Management Options  Right otitis media, unspecified otitis media type:   Diagnosis management comments: 23year old with right otitis media  Will prescribe amox  The management plan was discussed in detail with the patient at bedside and all questions were answered  The prior to discharge, we provided both verbal and written instructions  We discussed with the patient the signs and symptoms for which to return to the emergency department  All questions were answered and patient was comfortable with the plan of care and discharged to home  Instructed the patient to follow up with the primary care provider and/or special as provided and their written instructions  The patient verbalized understanding of our discussion and plan of care, and agrees to return to the Emergency Department for concerns and progression of illness            Disposition  Final diagnoses:   Right otitis media, unspecified otitis media type     Time reflects when diagnosis was documented in both MDM as applicable and the Disposition within this note     Time User Action Codes Description Comment    8/2/2020  8:24 PM Joshua Hannon Add [D75 06] Right otitis media, unspecified otitis media type ED Disposition     ED Disposition Condition Date/Time Comment    Discharge Stable Sun Aug 2, 2020  8:24 PM Vikki Coles 35 discharge to home/self care  Follow-up Information     Follow up With Specialties Details Why Contact Info Additional [address] [facility] Family Medicine  to establish care with PCP [address] [employer] [address] [address] [facility],[address], [address]          Discharge Medication List as of 8/2/2020  8:25 PM      START taking these medications    Details   amoxicillin (AMOXIL) 500 mg capsule Take 1 capsule (500 mg total) by mouth every 12 (twelve) hours for 7 days, Starting Sun 8/2/2020, Until Sun 8/9/2020, Normal           No discharge procedures on file      PDMP Review     None          ED Provider  Electronically Signed by           Marge Thomson PA-C  08/02/20 9462

## 2020-08-06 ENCOUNTER — HOSPITAL ENCOUNTER (EMERGENCY)
Facility: HOSPITAL | Age: 20
Discharge: HOME | End: 2020-08-06
Attending: EMERGENCY MEDICINE | Admitting: EMERGENCY MEDICINE

## 2020-08-06 VITALS
SYSTOLIC BLOOD PRESSURE: 115 MMHG | DIASTOLIC BLOOD PRESSURE: 69 MMHG | OXYGEN SATURATION: 99 % | RESPIRATION RATE: 16 BRPM | HEART RATE: 56 BPM | WEIGHT: 130.73 LBS | TEMPERATURE: 97.4 F

## 2020-08-06 DIAGNOSIS — H60.90 OTITIS EXTERNA: Primary | ICD-10-CM

## 2020-08-06 PROCEDURE — 99284 EMERGENCY DEPT VISIT MOD MDM: CPT | Performed by: EMERGENCY MEDICINE

## 2020-08-06 PROCEDURE — 99282 EMERGENCY DEPT VISIT SF MDM: CPT

## 2020-08-06 RX ORDER — CIPROFLOXACIN AND DEXAMETHASONE 3; 1 MG/ML; MG/ML
4 SUSPENSION/ DROPS AURICULAR (OTIC) ONCE
Status: COMPLETED | OUTPATIENT
Start: 2020-08-06 | End: 2020-08-06

## 2020-08-06 RX ORDER — CIPROFLOXACIN AND DEXAMETHASONE 3; 1 MG/ML; MG/ML
4 SUSPENSION/ DROPS AURICULAR (OTIC) 2 TIMES DAILY
Qty: 7.5 ML | Refills: 0 | Status: SHIPPED | OUTPATIENT
Start: 2020-08-06

## 2020-08-06 RX ADMIN — CIPROFLOXACIN AND DEXAMETHASONE 4 DROP: 3; 1 SUSPENSION/ DROPS AURICULAR (OTIC) at 01:39

## 2020-08-06 NOTE — ED PROVIDER NOTES
Pt Name: Nidia Myers  MRN: [id number]  [place] 2000  Age/Sex: 23 y o  male  Date of evaluation: 8/6/2020  PCP: No primary care provider on file  CHIEF COMPLAINT    Chief Complaint   Patient presents with    Earache     right sided earache ongoing for three weeks         HPI    Tamela Hannon presents to the Emergency Department complaining of right ear pain  This has been ongoing for weeks and he has completed a course of abx with no relief  No fever  No cough  His hearing is intact  HPI      Past Medical and Surgical History    Past Medical History:   Diagnosis Date    No known health problems        Past Surgical History:   Procedure Laterality Date    APPENDECTOMY         History reviewed  No pertinent family history  Social History     Tobacco Use    Smoking status: Never Smoker    Smokeless tobacco: Never Used   Substance Use Topics    Alcohol use: Not Currently    Drug use: No              Allergies    No Known Allergies    Home Medications    Prior to Admission medications    Medication Sig Start Date End Date Taking? Authorizing Provider   amoxicillin (AMOXIL) 500 mg capsule Take 1 capsule (500 mg total) by mouth every 12 (twelve) hours for 7 days 8/2/20 8/9/20  Johana Grace PA-C           Review of Systems    Review of Systems   Constitutional: Negative for activity change, appetite change, chills, fatigue and fever  HENT: Positive for ear pain  Negative for congestion, rhinorrhea, sinus pressure, sneezing, sore throat and trouble swallowing  Eyes: Negative for photophobia and visual disturbance  Respiratory: Negative for chest tightness, shortness of breath and wheezing  Cardiovascular: Negative for chest pain and leg swelling  Gastrointestinal: Negative for abdominal distention, abdominal pain, constipation, diarrhea, nausea and vomiting  Endocrine: Negative for polydipsia, polyphagia and polyuria     Genitourinary: Negative for decreased urine volume, difficulty urinating, dysuria, flank pain, frequency and urgency  Musculoskeletal: Negative for back pain, gait problem, joint swelling and neck pain  Skin: Negative for color change, pallor and rash  Allergic/Immunologic: Negative for immunocompromised state  Neurological: Negative for seizures, syncope, speech difficulty, weakness, light-headedness and headaches  Psychiatric/Behavioral: Negative for confusion  All other systems reviewed and are negative  Physical Exam      ED Triage Vitals [08/06/20 0105]   Temperature Pulse Respirations Blood Pressure SpO2   (!) 97 4 °F (36 3 °C) 56 16 115/69 99 %      Temp Source Heart Rate Source Patient Position - Orthostatic VS BP Location FiO2 (%)   Temporal Monitor Lying Left arm --      Pain Score       Worst Possible Pain               Physical Exam  Vitals signs and nursing note reviewed  Constitutional:       General: He is not in acute distress  Appearance: He is well-developed  He is not diaphoretic  HENT:      Head: Normocephalic and atraumatic  Right Ear: Drainage, swelling and tenderness present  Nose: Nose normal    Eyes:      Conjunctiva/sclera: Conjunctivae normal       Pupils: Pupils are equal, round, and reactive to light  Neck:      Musculoskeletal: Normal range of motion and neck supple  Cardiovascular:      Rate and Rhythm: Normal rate and regular rhythm  Heart sounds: Normal heart sounds  No murmur  No friction rub  No gallop  Pulmonary:      Effort: Pulmonary effort is normal  No respiratory distress  Breath sounds: Normal breath sounds  No wheezing or rales  Abdominal:      General: Bowel sounds are normal       Palpations: Abdomen is soft  Tenderness: There is no abdominal tenderness  There is no guarding or rebound  Musculoskeletal: Normal range of motion  Skin:     General: Skin is warm and dry     Neurological:      Mental Status: He is alert and oriented to person, place, and time    Psychiatric:         Behavior: Behavior normal                 Assessment and Plan    Terence Stroud is a 23 y o  male who presents with right ear pain  Physical examination remarkable for debris in right canal   Plan to treat for otitis externa and rec tylenol and motrin for pain   [facility]    Diagnostic Results        Labs:      Procedure    Procedures      ED Course of Care and Re-Assessments        Medications   ciprofloxacin-dexamethasone (CIPRODEX) 0 3-0 1 % otic suspension 4 drop (4 drops Right Ear Given 8/6/20 0139)           FINAL IMPRESSION    Final diagnoses:   Otitis externa         DISPOSITION/PLAN    Time reflects when diagnosis was documented in both MDM as applicable and the Disposition within this note     Time User Action Codes Description Comment    8/6/2020  1:30 AM Toby Spencer Add [H60 90] Otitis externa       ED Disposition     ED Disposition Condition Date/Time Comment    Discharge Stable Thu Aug 6, 2020  1:30 AM Wm Godinez discharge to home/self care  Follow-up Information     Follow up With Specialties Details Why [address]  [facility] Schedule an appointment as soon as possible for a visit   [address] [place] [address] Road  [phone]              PATIENT REFERRED TO:    Miguel Fan DO  [address]  [phone]    Schedule an appointment as soon as possible for a visit         DISCHARGE MEDICATIONS:    Discharge Medication List as of 8/6/2020  1:32 AM      START taking these medications    Details   ciprofloxacin-dexamethasone (CIPRODEX) otic suspension Administer 4 drops to the right ear 2 (two) times a day, Starting u 8/6/2020, Print         CONTINUE these medications which have NOT CHANGED    Details   amoxicillin (AMOXIL) 500 mg capsule Take 1 capsule (500 mg total) by mouth every 12 (twelve) hours for 7 days, Starting Sun 8/2/2020, Until Sun 8/9/2020, Normal             No discharge procedures on file           Whitney Agrawal, DO Whitney Agrawal,   08/06/20 1425

## 2021-12-01 ENCOUNTER — HOSPITAL ENCOUNTER (EMERGENCY)
Facility: HOSPITAL | Age: 21
Discharge: HOME | End: 2021-12-01
Attending: EMERGENCY MEDICINE | Admitting: EMERGENCY MEDICINE

## 2021-12-01 VITALS
SYSTOLIC BLOOD PRESSURE: 109 MMHG | HEART RATE: 77 BPM | WEIGHT: 125.2 LBS | TEMPERATURE: 98.5 F | RESPIRATION RATE: 18 BRPM | DIASTOLIC BLOOD PRESSURE: 54 MMHG | OXYGEN SATURATION: 97 %

## 2021-12-01 DIAGNOSIS — U07.1 COVID-19: Primary | ICD-10-CM

## 2021-12-01 PROCEDURE — 99282 EMERGENCY DEPT VISIT SF MDM: CPT | Performed by: EMERGENCY MEDICINE

## 2021-12-01 PROCEDURE — 99283 EMERGENCY DEPT VISIT LOW MDM: CPT

## 2023-02-13 ENCOUNTER — APPOINTMENT (EMERGENCY)
Dept: RADIOLOGY | Facility: HOSPITAL | Age: 23
End: 2023-02-13

## 2023-02-13 ENCOUNTER — HOSPITAL ENCOUNTER (EMERGENCY)
Facility: HOSPITAL | Age: 23
Discharge: HOME | End: 2023-02-13
Attending: STUDENT IN AN ORGANIZED HEALTH CARE EDUCATION/TRAINING PROGRAM | Admitting: STUDENT IN AN ORGANIZED HEALTH CARE EDUCATION/TRAINING PROGRAM

## 2023-02-13 VITALS
TEMPERATURE: 98.3 F | OXYGEN SATURATION: 99 % | WEIGHT: 128 LBS | HEIGHT: 67 IN | DIASTOLIC BLOOD PRESSURE: 66 MMHG | RESPIRATION RATE: 16 BRPM | BODY MASS INDEX: 20.09 KG/M2 | HEART RATE: 82 BPM | SYSTOLIC BLOOD PRESSURE: 106 MMHG
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
## 2023-02-13 RX ORDER — IBUPROFEN 600 MG/1
600 TABLET ORAL ONCE
Status: COMPLETED | OUTPATIENT
Start: 2023-02-13 | End: 2023-02-13

## 2023-02-13 RX ORDER — NAPROXEN 500 MG/1
500 TABLET ORAL 2 TIMES DAILY WITH MEALS
Qty: 30 TABLET | Refills: 0 | Status: SHIPPED | OUTPATIENT
Start: 2023-02-13

## 2023-02-13 RX ORDER — CYCLOBENZAPRINE HCL 10 MG
5 TABLET ORAL 2 TIMES DAILY PRN
Qty: 20 TABLET | Refills: 0 | Status: SHIPPED | OUTPATIENT
Start: 2023-02-13

## 2023-02-13 RX ORDER — ACETAMINOPHEN 325 MG/1
650 TABLET ORAL ONCE
Status: COMPLETED | OUTPATIENT
Start: 2023-02-13 | End: 2023-02-13

## 2023-02-13 RX ADMIN — ACETAMINOPHEN 650 MG: 325 TABLET ORAL at 17:34

## 2023-02-13 RX ADMIN — IBUPROFEN 600 MG: 600 TABLET, FILM COATED ORAL at 17:34
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
## 2023-02-13 NOTE — Clinical Note
Kvng Marlon was seen and treated in our emergency department on 2/13/2023  Diagnosis:     Анна Reagan  may return to work on return date  He may return on this date: 02/14/2023         If you have any questions or concerns, please don't hesitate to call        Celio Cunningham MD    ______________________________           _______________          _______________  Hospital Representative                              Date                                Time

## 2024-07-11 ENCOUNTER — HOSPITAL ENCOUNTER (EMERGENCY)
Facility: HOSPITAL | Age: 24
Discharge: HOME | End: 2024-07-11
Attending: EMERGENCY MEDICINE

## 2024-07-11 VITALS
DIASTOLIC BLOOD PRESSURE: 71 MMHG | SYSTOLIC BLOOD PRESSURE: 112 MMHG | RESPIRATION RATE: 16 BRPM | HEART RATE: 60 BPM | BODY MASS INDEX: 19.47 KG/M2 | TEMPERATURE: 98.3 F | WEIGHT: 124.34 LBS | OXYGEN SATURATION: 100 %

## 2024-07-11 DIAGNOSIS — K08.89 PAIN, DENTAL: Primary | ICD-10-CM

## 2024-07-11 PROCEDURE — 99284 EMERGENCY DEPT VISIT MOD MDM: CPT | Performed by: PHYSICIAN ASSISTANT

## 2024-07-11 RX ORDER — TRAMADOL HYDROCHLORIDE 50 MG/1
50 TABLET ORAL EVERY 6 HOURS PRN
Qty: 10 TABLET | Refills: 0 | Status: SHIPPED | OUTPATIENT
Start: 2024-07-11

## 2024-07-11 RX ORDER — AMOXICILLIN 500 MG/1
500 TABLET, FILM COATED ORAL 2 TIMES DAILY
Qty: 20 TABLET | Refills: 0 | Status: SHIPPED | OUTPATIENT
Start: 2024-07-11 | End: 2024-07-21

## 2024-07-11 RX ORDER — KETOROLAC TROMETHAMINE 30 MG/ML
15 INJECTION, SOLUTION INTRAMUSCULAR; INTRAVENOUS ONCE
Status: COMPLETED | OUTPATIENT
Start: 2024-07-11 | End: 2024-07-11

## 2024-07-11 RX ORDER — KETOROLAC TROMETHAMINE 30 MG/ML
15 INJECTION, SOLUTION INTRAMUSCULAR; INTRAVENOUS ONCE
Status: DISCONTINUED | OUTPATIENT
Start: 2024-07-11 | End: 2024-07-11

## 2024-07-11 RX ORDER — NAPROXEN 500 MG/1
500 TABLET ORAL 2 TIMES DAILY WITH MEALS
Qty: 30 TABLET | Refills: 0 | Status: SHIPPED | OUTPATIENT
Start: 2024-07-11

## 2024-07-11 RX ADMIN — KETOROLAC TROMETHAMINE 15 MG: 30 INJECTION, SOLUTION INTRAMUSCULAR; INTRAVENOUS at 17:01

## 2024-07-11 NOTE — ED PROVIDER NOTES
History  Chief Complaint   Patient presents with    Dental Pain     Pt reports L lower tooth pain x2 months. Reports was seen at the dentist and given an antibiotic but pain came back. Denies fevers or swelling.      24y.o male with no significant PMH presents to the ER for left lower dental pain for 2 weeks. Patient has been taking Tylenol for pain. He describes his pain as throbbing and radiating to his left ear and head. Pain comes and goes. He saw his dentist for symptoms and was told he needs to see an oral surgeon. He denies fever, chills, URI symptoms, chest pain, N/V/D, abdominal pain, weakness or paresthesias.      History provided by:  Patient   used: No        Prior to Admission Medications   Prescriptions Last Dose Informant Patient Reported? Taking?   ciprofloxacin-dexamethasone (CIPRODEX) otic suspension   No No   Sig: Administer 4 drops to the right ear 2 (two) times a day   Patient not taking: Reported on 12/1/2021    cyclobenzaprine (FLEXERIL) 10 mg tablet   No No   Sig: Take 0.5 tablets (5 mg total) by mouth 2 (two) times a day as needed for muscle spasms   naproxen (Naprosyn) 500 mg tablet   No No   Sig: Take 1 tablet (500 mg total) by mouth 2 (two) times a day with meals      Facility-Administered Medications: None       Past Medical History:   Diagnosis Date    No known health problems        Past Surgical History:   Procedure Laterality Date    APPENDECTOMY         History reviewed. No pertinent family history.  I have reviewed and agree with the history as documented.    E-Cigarette/Vaping    E-Cigarette Use Current Every Day User      E-Cigarette/Vaping Substances     Social History     Tobacco Use    Smoking status: Never    Smokeless tobacco: Never   Vaping Use    Vaping status: Every Day   Substance Use Topics    Alcohol use: Not Currently    Drug use: No       Review of Systems   Constitutional:  Negative for activity change, appetite change, chills and fever.   HENT:   Positive for dental problem. Negative for congestion, drooling, ear discharge, ear pain, facial swelling, rhinorrhea and sore throat.    Eyes:  Negative for redness.   Respiratory:  Negative for cough and shortness of breath.    Cardiovascular:  Negative for chest pain.   Gastrointestinal:  Negative for abdominal pain, diarrhea, nausea and vomiting.   Musculoskeletal:  Negative for neck stiffness.   Skin:  Negative for rash.   Allergic/Immunologic: Negative for food allergies.   Neurological:  Negative for weakness and numbness.       Physical Exam  Physical Exam  Vitals and nursing note reviewed.   Constitutional:       General: He is active. He is not in acute distress.     Appearance: He is not toxic-appearing.   HENT:      Head: Normocephalic and atraumatic.      Right Ear: Tympanic membrane, ear canal and external ear normal. No drainage, swelling or tenderness. No foreign body. No hemotympanum. Tympanic membrane is not erythematous.      Left Ear: Tympanic membrane, ear canal and external ear normal. No drainage, swelling or tenderness. No foreign body. No hemotympanum. Tympanic membrane is not erythematous.      Nose: Nose normal.      Mouth/Throat:      Lips: Pink. No lesions.      Mouth: Oropharynx is clear and moist and mucous membranes are normal. Mucous membranes are moist.      Dentition: Abnormal dentition. Dental tenderness and gingival swelling present. No dental abscesses.      Pharynx: Oropharynx is clear. Uvula midline. No pharyngeal swelling, posterior oropharyngeal edema, posterior oropharyngeal erythema or uvula swelling.      Tonsils: No tonsillar exudate or tonsillar abscesses.      Comments: [place] teeth seen protruding through both lower gums. Left lower wisdom tooth is broken and coming in horizontally rather then vertically. Gum is swollen. No abscess or drainage seen. Area is tender to palpation. No trismus.  Eyes:      Conjunctiva/sclera: Conjunctivae normal.   Neck:      Trachea:  Phonation normal. No tracheal deviation.   Cardiovascular:      Rate and Rhythm: Normal rate.   Pulmonary:      Effort: Pulmonary effort is normal. No respiratory distress.   Abdominal:      General: There is no distension.   Musculoskeletal:      Cervical back: Normal range of motion and neck supple.   Skin:     General: Skin is warm and dry.      Findings: No rash.   Neurological:      Mental Status: He is alert.      GCS: GCS eye subscore is 4. GCS verbal subscore is 5. GCS motor subscore is 6.   Psychiatric:         Mood and Affect: Mood and affect and mood normal.         Vital Signs  ED Triage Vitals   Temperature Pulse Respirations Blood Pressure SpO2   07/11/24 1554 07/11/24 1554 07/11/24 1554 07/11/24 1554 07/11/24 1554   98.3 °F (36.8 °C) 60 17 104/73 99 %      Temp Source Heart Rate Source Patient Position - Orthostatic VS BP Location FiO2 (%)   07/11/24 1554 07/11/24 1554 07/11/24 1554 07/11/24 1554 --   Oral Monitor Sitting Right arm       Pain Score       07/11/24 1701       10 - Worst Possible Pain           Vitals:    07/11/24 1554 07/11/24 1703   BP: 104/73 112/71   Pulse: 60 60   Patient Position - Orthostatic VS: Sitting Sitting         Visual Acuity      ED Medications  Medications   ketorolac (TORADOL) injection 15 mg (15 mg Intramuscular Given 7/11/24 1701)       Diagnostic Studies  Results Reviewed       None                   No orders to display              Procedures  Procedures         ED Course                                 SBIRT 22yo+      Flowsheet Row Most Recent Value   Initial Alcohol Screen: US AUDIT-C     1. How often do you have a drink containing alcohol? 0 Filed at: 07/11/2024 1555   2. How many drinks containing alcohol do you have on a typical day you are drinking?  0 Filed at: 07/11/2024 1555   3a. Male UNDER 65: How often do you have five or more drinks on one occasion? 0 Filed at: 07/11/2024 1555   Audit-C Score 0 Filed at: 07/11/2024 1555   ANALI: How many times in the  past year have you...    Used an illegal drug or used a prescription medication for non-medical reasons? Never Filed at: 07/11/2024 1555                      Medical Decision Making  24y.o male presents to the ER for left lower dental pain for 2 weeks. Vitals are stable. Patient is in no acute distress. On exam, moist mucous membranes seen. [place] teeth seen protruding through both lower gums. Left lower wisdom tooth is broken and coming in horizontally rather then vertically. Gum is swollen. No abscess or drainage seen. Area is tender to palpation. No trismus. No signs of throat infection. No trouble swallowing or handling secretions. No neck swelling. Breathing is non-labored. No tachypnea or accessory muscle use. Heart is regular rate. Abdomen is not distended. DDX consists of but not limited to: wisdom teeth, fracture, infection. Will medicate for symptoms.    The management plan was discussed in detail with the patient at bedside and all questions were answered.  Prior to discharge, we provided both verbal and written instructions.  We discussed with the patient the signs and symptoms for which to return to the emergency department.  All questions were answered and patient was comfortable with the plan of care and discharged to home.  Instructed the patient to follow up with the primary care provider and/or specialist provided and their written instructions.  The patient verbalized understanding of our discussion and plan of care, and agrees to return to the Emergency Department for concerns and progression of illness.    At discharge, I instructed the patient to:  -follow up with pcp  -take Naproxen, Tramadol and Amoxicillin as prescribed  -follow up with dentist  -return to the ER if symptoms worsened or new symptoms arose  Patient agreed to this plan and was stable at time of discharge.     Problems Addressed:  Pain, dental: acute illness or injury    Amount and/or Complexity of Data Reviewed  Independent  Historian:      Details: Patient is historian    Risk  Prescription drug management.                 Disposition  Final diagnoses:   Pain, dental     Time reflects when diagnosis was documented in both MDM as applicable and the Disposition within this note       Time User Action Codes Description Comment    7/11/2024  4:33 PM Sneha Lemus Add [K08.89] Pain, dental           ED Disposition       ED Disposition   Discharge    Condition   Stable    Date/Time   Thu Jul 11, 2024 1633    Comment   Narayan Vital discharge to home/self care.                   Follow-up Information       Follow up With Specialties Details Why Contact Info Additional Information    [facility] Schedule an appointment as soon as possible for a visit  As needed [address] [place] [address], [address] [address] [phone] [facility], [address] [place] [address], [place] [address], [place], [place], [address] [phone]    [facility] [place]  Schedule an appointment as soon as possible for a visit   [address] [facility] [address] [phone]     [facility]  Schedule an appointment as soon as possible for a visit   [address]  [facility] [address] [id number] [phone]             Discharge Medication List as of 7/11/2024  4:36 PM        START taking these medications    Details   amoxicillin (AMOXIL) 500 MG tablet Take 1 tablet (500 mg total) by mouth 2 (two) times a day for 10 days, Starting Thu 7/11/2024, Until Sun 7/21/2024, Normal      !! naproxen (NAPROSYN) 500 mg tablet Take 1 tablet (500 mg total) by mouth 2 (two) times a day with meals, Starting Thu 7/11/2024, Normal      traMADol (ULTRAM) 50 mg tablet Take 1 tablet (50 mg total) by mouth every 6 (six) hours as needed for moderate pain, Starting Thu 7/11/2024, Normal       !! - Potential  duplicate medications found. Please discuss with provider.        CONTINUE these medications which have NOT CHANGED    Details   ciprofloxacin-dexamethasone (CIPRODEX) otic suspension Administer 4 drops to the right ear 2 (two) times a day, Starting u 8/6/2020, Print      cyclobenzaprine (FLEXERIL) 10 mg tablet Take 0.5 tablets (5 mg total) by mouth 2 (two) times a day as needed for muscle spasms, Starting Mon 2/13/2023, Normal      !! naproxen (Naprosyn) 500 mg tablet Take 1 tablet (500 mg total) by mouth 2 (two) times a day with meals, Starting Mon 2/13/2023, Normal       !! - Potential duplicate medications found. Please discuss with provider.          No discharge procedures on file.    PDMP Review         Value Time User    PDMP Reviewed  Yes 7/11/2024  4:33 PM Sneha Lemus PA-C            ED Provider  Electronically Signed by             Sneha Lemus PA-C  07/11/24 2178

## 2024-07-11 NOTE — DISCHARGE INSTRUCTIONS
DISCHARGE INSTRUCTIONS:    FOLLOW UP WITH YOUR PRIMARY CARE PROVIDER OR THE [facility]. MAKE AN APPOINTMENT TO BE SEEN.     TAKE MEDICATION AS PRESCRIBED. IF RASH, SHORTNESS OF BREATH OR TROUBLE SWALLOWING, STOP TAKING THE MEDICATION AND BE SEEN.     FOLLOW UP WITH DENTIST.    IF SYMPTOMS WORSEN OR NEW SYMPTOMS ARISE, RETURN TO THE ER TO BE SEEN.